# Patient Record
Sex: FEMALE | Race: BLACK OR AFRICAN AMERICAN
[De-identification: names, ages, dates, MRNs, and addresses within clinical notes are randomized per-mention and may not be internally consistent; named-entity substitution may affect disease eponyms.]

---

## 2017-02-13 ENCOUNTER — HOSPITAL ENCOUNTER (EMERGENCY)
Dept: HOSPITAL 72 - EMR | Age: 32
Discharge: HOME | End: 2017-02-13
Payer: MEDICAID

## 2017-02-13 VITALS — DIASTOLIC BLOOD PRESSURE: 69 MMHG | SYSTOLIC BLOOD PRESSURE: 116 MMHG

## 2017-02-13 VITALS — WEIGHT: 110 LBS | BODY MASS INDEX: 18.78 KG/M2 | HEIGHT: 64 IN

## 2017-02-13 VITALS — SYSTOLIC BLOOD PRESSURE: 118 MMHG | DIASTOLIC BLOOD PRESSURE: 79 MMHG

## 2017-02-13 DIAGNOSIS — N39.0: Primary | ICD-10-CM

## 2017-02-13 LAB
APPEARANCE UR: (no result)
BACTERIA #/AREA URNS HPF: (no result) /HPF
KETONES UR QL STRIP: NEGATIVE
LEUKOCYTE ESTERASE UR QL STRIP: (no result)
NITRITE UR QL STRIP: POSITIVE
PH UR STRIP: 6 [PH] (ref 4.5–8)
PROT UR QL STRIP: (no result)
SP GR UR STRIP: 1.02 (ref 1–1.03)
SQUAMOUS #/AREA URNS LPF: (no result) /LPF
UROBILINOGEN UR-MCNC: NORMAL MG/DL (ref 0–1)
WBC #/AREA URNS HPF: (no result) /HPF (ref 0–2)

## 2017-02-13 PROCEDURE — 81003 URINALYSIS AUTO W/O SCOPE: CPT

## 2017-02-13 PROCEDURE — 99284 EMERGENCY DEPT VISIT MOD MDM: CPT

## 2017-02-13 PROCEDURE — 87086 URINE CULTURE/COLONY COUNT: CPT

## 2017-02-13 PROCEDURE — 87181 SC STD AGAR DILUTION PER AGT: CPT

## 2017-02-13 PROCEDURE — 81025 URINE PREGNANCY TEST: CPT

## 2017-02-15 NOTE — EMERGENCY ROOM REPORT
History of Present Illness


General


Chief Complaint:  Female Urogenital Problems


Source:  Patient





Present Illness


HPI


The patient is a 32-year-old female who presented after increased dysuria as 

well as frequency of urination.  Patient had similar symptoms in the past when 

she had urinary tract infection.  Patient thinks that she has a urinary tract 

infection at this time.  The patient denies any vaginal discharge.  Patient has 

had some mild upper respiratory congestion.  She denied chest pain or shortness 

of breath.  The patient had no fever and had not been vomiting.


Allergies:  


Coded Allergies:  


     No Known Allergies (Unverified , 8/13/14)





Patient History


Past Medical History:  see triage record


Last Menstrual Period:  now-


Pregnant Now:  No


Reviewed Nursing Documentation:  PMH: Agreed, PSxH: Agreed





Nursing Documentation-PMH


Past Medical History:  No History, Except For


Hx Cardiac Problems:  No


Hx Cancer:  No


Hx Gastrointestinal Problems:  No


Hx Neurological Problems:  No





Review of Systems


All Other Systems:  negative except mentioned in HPI





Physical Exam





Vital Signs








  Date Time  Temp Pulse Resp B/P Pulse Ox O2 Delivery O2 Flow Rate FiO2


 


2/13/17 10:12 98.8  16 118/79 99 Room Air  


 


2/13/17 10:23  75      








General Appearance:  well appearing, no apparent distress, alert, GCS 15


Head:  normocephalic, atraumatic


ENT:  hearing grossly normal, normal voice


Neck:  full range of motion, supple


Respiratory:  no respiratory distress, speaking full sentences


Gastrointestinal:  normal inspection, normal bowel sounds, non tender


Genitourinary:  normal inspection, no CVA tenderness


Musculoskeletal:  normal inspection, back normal, no calf tenderness


Neurologic:  normal inspection, alert, oriented x3, responsive, normal gait


Psychiatric:  mood/affect normal


Skin:  no rash





Medical Decision Making


Diagnostic Impression:  


 Primary Impression:  


 UTI (urinary tract infection)


ER Course


Patient presented for dysuria.  Differential diagnosis included was not limited 

to appendicitis, urinary tract infection, pelvic inflammatory disease, 

urethritis, herpes among others. Patient's benign exam and does not appear to 

require any further imaging or laboratory testing at this time.  Urinalysis 

showed evidence of infection.  Patient was given prescription for Keflex.Urine 

pregnancy test was negative.   The patient is advised to follow up with  

primary care doctor in 1-2 days.  Patient is advised to return if any worsening 

condition or if any changes in status that are concerning.





Last Vital Signs








  Date Time  Temp Pulse Resp B/P Pulse Ox O2 Delivery O2 Flow Rate FiO2


 


2/13/17 12:04 98.8 69 16 116/69 99 Room Air  








Status:  improved


Disposition:  HOME, SELF-CARE


Condition:  Stable


Scripts


Phenazopyridine Hcl* (PYRIDIUM*) 200 Mg Tablet


200 MG ORAL THREE TIMES A DAY, #14 TAB 0 Refills


   Prov: Eduardo Sinclair         2/13/17 


Cephalexin* (KEFLEX*) 500 Mg Capsule


500 MG ORAL Q6H, #28 CAP 0 Refills


   Prov: Eduardo Sinclair         2/13/17


Referrals:  


NON PHYSICIAN (PCP)


Patient Instructions:  Urinary Tract Infection











Eduardo Sinclair Feb 15, 2017 13:43

## 2018-07-13 ENCOUNTER — HOSPITAL ENCOUNTER (EMERGENCY)
Dept: HOSPITAL 72 - EMR | Age: 33
Discharge: HOME | End: 2018-07-13
Payer: MEDICAID

## 2018-07-13 VITALS — WEIGHT: 110 LBS | BODY MASS INDEX: 18.78 KG/M2 | HEIGHT: 64 IN

## 2018-07-13 VITALS — SYSTOLIC BLOOD PRESSURE: 112 MMHG | DIASTOLIC BLOOD PRESSURE: 78 MMHG

## 2018-07-13 VITALS — DIASTOLIC BLOOD PRESSURE: 78 MMHG | SYSTOLIC BLOOD PRESSURE: 112 MMHG

## 2018-07-13 DIAGNOSIS — N39.0: Primary | ICD-10-CM

## 2018-07-13 DIAGNOSIS — F43.9: ICD-10-CM

## 2018-07-13 DIAGNOSIS — R53.1: ICD-10-CM

## 2018-07-13 LAB
APPEARANCE UR: CLEAR
APTT PPP: YELLOW S
GLUCOSE UR STRIP-MCNC: NEGATIVE MG/DL
KETONES UR QL STRIP: (no result)
LEUKOCYTE ESTERASE UR QL STRIP: (no result)
NITRITE UR QL STRIP: POSITIVE
PH UR STRIP: 5 [PH] (ref 4.5–8)
PROT UR QL STRIP: (no result)
SP GR UR STRIP: 1.02 (ref 1–1.03)
UROBILINOGEN UR-MCNC: NORMAL MG/DL (ref 0–1)

## 2018-07-13 PROCEDURE — 87181 SC STD AGAR DILUTION PER AGT: CPT

## 2018-07-13 PROCEDURE — 99283 EMERGENCY DEPT VISIT LOW MDM: CPT

## 2018-07-13 PROCEDURE — 87086 URINE CULTURE/COLONY COUNT: CPT

## 2018-07-13 PROCEDURE — 81003 URINALYSIS AUTO W/O SCOPE: CPT

## 2018-07-13 NOTE — EMERGENCY ROOM REPORT
History of Present Illness


General


Chief Complaint:  Abdominal Pain


Source:  Patient





Present Illness


HPI


Is a 33-year-old female with no significant past medical history.  She presents 

with initially abdominal pain but to me chief complaint of generalized malaise 

and weakness.  She says she a lot of stress and hasn't been eating much.  

Decreased appetite for less than a week.  Said her urine is dark.  Denies any 

nausea vomiting.  Denies any fever chills but not suicidal homicidal.  She 

checked in because she brought her daughter here for rash.


Allergies:  


Coded Allergies:  


     No Known Allergies (Unverified , 8/13/14)





Patient History


Past Medical History:  see triage record, old chart reviewed


Past Surgical History:  other


Pertinent Family History:  none


Social History:  Denies: smoking


Last Menstrual Period:  UNK


Pregnant Now:  No


Immunizations:  other


Reviewed Nursing Documentation:  PMH: Agreed; PSxH: Agreed





Nursing Documentation-PMH


Hx Cardiac Problems:  No


Hx Cancer:  No


Hx Gastrointestinal Problems:  No


Hx Neurological Problems:  No





Review of Systems


Constitutional:  Reports: malaise, weakness


Eye:  Denies: eye pain, blurred vision


ENT:  Denies: ear pain, nose congestion, throat swelling


Respiratory:  Denies: cough, shortness of breath


Cardiovascular:  Denies: chest pain, palpitations


Gastrointestinal:  Denies: abdominal pain, diarrhea, nausea, vomiting


Musculoskeletal:  Denies: back pain, joint pain


Skin:  Denies: rash


Neurological:  Denies: headache, numbness


Endocrine:  Denies: increased thirst, increased urine


Hematologic/Lymphatic:  Denies: easy bruising


All Other Systems:  negative except mentioned in HPI





Physical Exam





Vital Signs








  Date Time  Temp Pulse Resp B/P (MAP) Pulse Ox O2 Delivery O2 Flow Rate FiO2


 


7/13/18 20:16 97.0 61 18 112/78 100 Room Air  





 97.0       





vitals normal


Sp02 EP Interpretation:  reviewed, normal


General Appearance:  well appearing, no apparent distress, alert


Head:  normocephalic, atraumatic


Eyes:  bilateral eye PERRL, bilateral eye EOMI


ENT:  hearing grossly normal, normal pharynx


Neck:  full range of motion, supple, no meningismus


Respiratory:  chest non-tender, lungs clear, normal breath sounds


Cardiovascular #1:  regular rate, rhythm, no murmur


Gastrointestinal:  normal bowel sounds, non tender, no mass, no organomegaly, 

no bruit, non-distended


Musculoskeletal:  back normal, gait/station normal, normal range of motion


Psychiatric:  mood/affect normal


Skin:  warm/dry





Medical Decision Making


Diagnostic Impression:  


 Primary Impression:  


 UTI (urinary tract infection)


 Qualified Codes:  N30.00 - Acute cystitis without hematuria


 Additional Impression:  


 Stress and adjustment reaction


ER Course


Patient presents with stress reaction.  No culture for 5150.  She does have a 

urinary tract infection.  We'll put on antibiotics.





Last Vital Signs








  Date Time  Temp Pulse Resp B/P (MAP) Pulse Ox O2 Delivery O2 Flow Rate FiO2


 


7/13/18 20:25 97.0 61 18 112/78 100 Room Air  





 97.0       








Status:  unchanged


Disposition:  HOME, SELF-CARE


Condition:  Stable


Scripts


Cephalexin* (KEFLEX*) 500 Mg Capsule


500 MG ORAL TID, #21 CAP


   Prov: ABDOULAYE PRATHER M.D.         7/13/18





Additional Instructions:  


Follow-up with your doctor in 7 days.  Return if symptom worsen.











ABDOULAYE PRATHER M.D. Jul 13, 2018 21:42

## 2019-10-30 ENCOUNTER — HOSPITAL ENCOUNTER (EMERGENCY)
Dept: HOSPITAL 72 - EMR | Age: 34
Discharge: HOME | End: 2019-10-30
Payer: SELF-PAY

## 2019-10-30 VITALS — BODY MASS INDEX: 19.49 KG/M2 | HEIGHT: 63 IN | WEIGHT: 110 LBS

## 2019-10-30 VITALS — SYSTOLIC BLOOD PRESSURE: 113 MMHG | DIASTOLIC BLOOD PRESSURE: 75 MMHG

## 2019-10-30 VITALS — SYSTOLIC BLOOD PRESSURE: 122 MMHG | DIASTOLIC BLOOD PRESSURE: 74 MMHG

## 2019-10-30 DIAGNOSIS — L03.012: Primary | ICD-10-CM

## 2019-10-30 PROCEDURE — 99282 EMERGENCY DEPT VISIT SF MDM: CPT

## 2019-10-30 NOTE — EMERGENCY ROOM REPORT
History of Present Illness


General


Chief Complaint:  Pain


Source:  Patient





Present Illness


HPI


33 YO female presents to the ED c/o 8/10 in severity pain, swelling and 

erythema to the side of the cuticle of her left ring finger x 2 days. Pt. 

reports specifically biting a hang nail from there several days prior. Pt. 

denies trauma or fall. Denies fevers, chills, paresthesias,or finger pad 

tenderness. Has been trying warm water soaks without relief.


Allergies:  


Coded Allergies:  


     No Known Allergies (Unverified , 10/8/18)





Patient History


Past Medical History:  see triage record


Past Surgical History:  none


Pertinent Family History:  none


Last Menstrual Period:  9/01/19


Reviewed Nursing Documentation:  PMH: Agreed; PSxH: Agreed





Nursing Documentation-PMH


Past Medical History:  No Stated History


Hx Cardiac Problems:  No


Hx Cancer:  No


Hx Gastrointestinal Problems:  No


Hx Dialysis:  Yes - kidney stone


Hx Neurological Problems:  No





Review of Systems


All Other Systems:  negative except mentioned in HPI





Physical Exam





Vital Signs








  Date Time  Temp Pulse Resp B/P (MAP) Pulse Ox O2 Delivery O2 Flow Rate FiO2


 


10/30/19 14:37 98.2 71 16 113/75 (88) 100 Room Air  








Sp02 EP Interpretation:  reviewed, normal


General Appearance:  no apparent distress, alert, GCS 15, non-toxic


Head:  normocephalic, atraumatic


Eyes:  bilateral eye normal inspection, bilateral eye PERRL


ENT:  hearing grossly normal, normal voice


Neck:  full range of motion


Respiratory:  lungs clear, normal breath sounds, speaking full sentences


Cardiovascular #1:  regular rate, rhythm, normal capillary refill


Musculoskeletal:  back normal, gait/station normal, normal range of motion, non-

tender


Neurologic:  alert, oriented x3, responsive, motor strength/tone normal, 

sensory intact, speech normal, grossly normal


Psychiatric:  judgement/insight normal


Skin:  other - left ring finger paronychia- no visible purulence, no fluctuance

, no fingerpad tenderness. erythema, swelling and warmth only.





Medical Decision Making


PA Attestation


Dr. Burgos is my supervising Physician whom patient management has been 

discussed with.


Diagnostic Impression:  


 Primary Impression:  


 Paronychia


ER Course


33 YO female presents to the ED c/o 8/10 in severity pain, swelling and 

erythema to the side of the cuticle of her left ring finger x 2 days. Pt. 

reports specifically biting a hang nail from there several days prior. Pt. 

denies trauma or fall. Denies fevers, chills, paresthesias,or finger pad 

tenderness. Has been trying warm water soaks without relief. 





Ddx considered but are not limited to cellulitis, paronychia, eponychia, 

ingrown toe nail, fracture, d/L, gout








Vital signs: are WNL, pt. is afebrile


H&PE are most consistent with left ring finger paronychia- no visible purulence

, no fluctuance, no fingerpad tenderness. erythema, swelling and warmth only. 


ORDERS: none required at this time, the diagnosis is clinical





ED INTERVENTIONS: 


-None provided at this time. 








DISCHARGE: At this time pt. is stable for d/c to home. Will provide printed 

patient care instructions, and any necessary prescriptions. Care plan and 

follow up instructions have been discussed with the patient prior to discharge.





Last Vital Signs








  Date Time  Temp Pulse Resp B/P (MAP) Pulse Ox O2 Delivery O2 Flow Rate FiO2


 


10/30/19 14:42 98.2 71 16 113/75 100 Room Air  








Disposition:  HOME, SELF-CARE


Condition:  Stable


Scripts


Amoxicillin/Potassium Clav 875-125* (AUGMENTIN 875-125 TABLET*) 1 Each Tablet


1 TAB ORAL TWICE A DAY for 7 Days, #14 TAB


   Prov: Shayla Kimbrough         10/30/19 


Mupirocin* (MUPIROCIN*) 22 Gm Oint...g.


1 APPLIC TOPIC THREE TIMES A DAY, #22 GM


   Prov: Shayla Kimbrough         10/30/19


Patient Instructions:  Paronychia, Easy-to-Read





Additional Instructions:  


Take medications as directed. 





 ** Follow up with a Primary Care Provider in 3-5 days, even if your symptoms 

have resolved. ** 





Return sooner to ED if new symptoms occur, or current symptoms become worse. 











- Please note that this Emergency Department Report was dictated using [employer] technology software, occasionally this can lead to 

erroneous entry secondary to interpretation by the dictation equipment.











Shayla Kimbrough Oct 30, 2019 15:31

## 2019-10-30 NOTE — NUR
ED Nurse Note:



PT WALKED IN TO ER TODAY FROM HOME. AOX4. PT C/O LEFT HAND RING FINGER PAIN X 
YESTERDAY. PT STATES SHE BELIEVES IT MAY BE AN INGROWN NAIL. ON ASSESSMENT, 
SOME SWELLING AND REDNESS NOTED BUT NO DISCHARGE OR BLEEDING. FULL ROM OF 
DIGIT. PT DENIES NUMBNESS OR TINGLING.

## 2019-11-24 ENCOUNTER — HOSPITAL ENCOUNTER (EMERGENCY)
Dept: HOSPITAL 72 - EMR | Age: 34
Discharge: HOME | End: 2019-11-24
Payer: SELF-PAY

## 2019-11-24 VITALS — SYSTOLIC BLOOD PRESSURE: 128 MMHG | DIASTOLIC BLOOD PRESSURE: 92 MMHG

## 2019-11-24 VITALS — SYSTOLIC BLOOD PRESSURE: 125 MMHG | DIASTOLIC BLOOD PRESSURE: 78 MMHG

## 2019-11-24 VITALS — WEIGHT: 108 LBS | BODY MASS INDEX: 19.14 KG/M2 | HEIGHT: 63 IN

## 2019-11-24 VITALS — SYSTOLIC BLOOD PRESSURE: 130 MMHG | DIASTOLIC BLOOD PRESSURE: 80 MMHG

## 2019-11-24 VITALS — SYSTOLIC BLOOD PRESSURE: 135 MMHG | DIASTOLIC BLOOD PRESSURE: 98 MMHG

## 2019-11-24 DIAGNOSIS — Z3A.01: ICD-10-CM

## 2019-11-24 DIAGNOSIS — O23.41: ICD-10-CM

## 2019-11-24 DIAGNOSIS — Z87.442: ICD-10-CM

## 2019-11-24 DIAGNOSIS — O21.9: Primary | ICD-10-CM

## 2019-11-24 LAB
ADD MANUAL DIFF: NO
ALBUMIN SERPL-MCNC: 3.5 G/DL (ref 3.4–5)
ALBUMIN/GLOB SERPL: 1 {RATIO} (ref 1–2.7)
ALP SERPL-CCNC: 45 U/L (ref 46–116)
ALT SERPL-CCNC: 22 U/L (ref 12–78)
ANION GAP SERPL CALC-SCNC: 9 MMOL/L (ref 5–15)
APPEARANCE UR: CLEAR
APTT PPP: YELLOW S
AST SERPL-CCNC: 18 U/L (ref 15–37)
BASOPHILS NFR BLD AUTO: 0.4 % (ref 0–2)
BILIRUB SERPL-MCNC: 0.6 MG/DL (ref 0.2–1)
BUN SERPL-MCNC: 12 MG/DL (ref 7–18)
CALCIUM SERPL-MCNC: 8.3 MG/DL (ref 8.5–10.1)
CHLORIDE SERPL-SCNC: 103 MMOL/L (ref 98–107)
CO2 SERPL-SCNC: 22 MMOL/L (ref 21–32)
CREAT SERPL-MCNC: 0.8 MG/DL (ref 0.55–1.3)
EOSINOPHIL NFR BLD AUTO: 0.2 % (ref 0–3)
ERYTHROCYTE [DISTWIDTH] IN BLOOD BY AUTOMATED COUNT: 11.1 % (ref 11.6–14.8)
GLOBULIN SER-MCNC: 3.5 G/DL
GLUCOSE UR STRIP-MCNC: NEGATIVE MG/DL
HCT VFR BLD CALC: 34.3 % (ref 37–47)
HGB BLD-MCNC: 11.7 G/DL (ref 12–16)
KETONES UR QL STRIP: (no result)
LEUKOCYTE ESTERASE UR QL STRIP: (no result)
LYMPHOCYTES NFR BLD AUTO: 20.3 % (ref 20–45)
MCV RBC AUTO: 86 FL (ref 80–99)
MONOCYTES NFR BLD AUTO: 6.6 % (ref 1–10)
NEUTROPHILS NFR BLD AUTO: 72.5 % (ref 45–75)
NITRITE UR QL STRIP: POSITIVE
PH UR STRIP: 5 [PH] (ref 4.5–8)
PLATELET # BLD: 199 K/UL (ref 150–450)
POTASSIUM SERPL-SCNC: 3.8 MMOL/L (ref 3.5–5.1)
PROT UR QL STRIP: (no result)
RBC # BLD AUTO: 4 M/UL (ref 4.2–5.4)
SODIUM SERPL-SCNC: 134 MMOL/L (ref 136–145)
SP GR UR STRIP: 1.02 (ref 1–1.03)
UROBILINOGEN UR-MCNC: NORMAL MG/DL (ref 0–1)
WBC # BLD AUTO: 9.1 K/UL (ref 4.8–10.8)

## 2019-11-24 PROCEDURE — 84702 CHORIONIC GONADOTROPIN TEST: CPT

## 2019-11-24 PROCEDURE — 36415 COLL VENOUS BLD VENIPUNCTURE: CPT

## 2019-11-24 PROCEDURE — 96361 HYDRATE IV INFUSION ADD-ON: CPT

## 2019-11-24 PROCEDURE — 99284 EMERGENCY DEPT VISIT MOD MDM: CPT

## 2019-11-24 PROCEDURE — 96375 TX/PRO/DX INJ NEW DRUG ADDON: CPT

## 2019-11-24 PROCEDURE — 81003 URINALYSIS AUTO W/O SCOPE: CPT

## 2019-11-24 PROCEDURE — 86850 RBC ANTIBODY SCREEN: CPT

## 2019-11-24 PROCEDURE — 81025 URINE PREGNANCY TEST: CPT

## 2019-11-24 PROCEDURE — 76830 TRANSVAGINAL US NON-OB: CPT

## 2019-11-24 PROCEDURE — 76801 OB US < 14 WKS SINGLE FETUS: CPT

## 2019-11-24 PROCEDURE — 80053 COMPREHEN METABOLIC PANEL: CPT

## 2019-11-24 PROCEDURE — 86901 BLOOD TYPING SEROLOGIC RH(D): CPT

## 2019-11-24 PROCEDURE — 86900 BLOOD TYPING SEROLOGIC ABO: CPT

## 2019-11-24 PROCEDURE — 85025 COMPLETE CBC W/AUTO DIFF WBC: CPT

## 2019-11-24 PROCEDURE — 96374 THER/PROPH/DIAG INJ IV PUSH: CPT

## 2019-11-24 NOTE — EMERGENCY ROOM REPORT
History of Present Illness


General


Chief Complaint:  Vomiting


Source:  Patient





Present Illness


HPI


34-year-old female who is G3, P2 and no significant past medical history here 

complaining of 2 days of multiple bouts of nonbloody emesis and nausea.  

Patient denies abdominal pain, diarrhea and constipation, fever and chills.  

Denies drinking alcohol, or intake of new food.  Denies recent travel or 

antibiotic use.  Patient also has her daughter who presents with similar 

symptoms x2 days.  Patient denies any urinary frequency, dysuria, hematuria 

reports that she does not recall her last menstrual period and reports that she 

took an IUD out about 1 month ago had few episodes of spotting right after 

however has not had her menses since.  Denies chest pain, shortness of breath, 

palpitation, headache, dizziness.  No tenderness or guarding noted upon 

palpation of her abdomen.


Allergies:  


Coded Allergies:  


     No Known Allergies (Unverified , 10/8/18)





Patient History


Past Medical History:  see triage record


Past Surgical History:  unable to obtain


Pertinent Family History:  none


Pregnant Now:  No


Immunizations:  UTD


Reviewed Nursing Documentation:  PMH: Agreed; PSxH: Agreed





Nursing Documentation-PMH


Past Medical History:  No History, Except For


Hx Cardiac Problems:  No


Hx Cancer:  No


Hx Gastrointestinal Problems:  No


Hx Dialysis:  Yes - kidney stone


Hx Neurological Problems:  No





Review of Systems


All Other Systems:  negative except mentioned in HPI





Physical Exam





Vital Signs








  Date Time  Temp Pulse Resp B/P (MAP) Pulse Ox O2 Delivery O2 Flow Rate FiO2


 


19 11:51 98.4 60 20 117/75 (89) 100 Room Air  








Sp02 EP Interpretation:  reviewed, normal


General Appearance:  no apparent distress, alert, GCS 15, non-toxic


Head:  normocephalic, atraumatic


Eyes:  bilateral eye normal inspection, bilateral eye PERRL


ENT:  hearing grossly normal, normal pharynx, no angioedema, normal voice


Neck:  full range of motion, supple, supple/symm/no masses


Respiratory:  chest non-tender, lungs clear, normal breath sounds, no rhonchi, 

no respiratory distress, speaking full sentences


Cardiovascular #1:  regular rate, rhythm, no edema, no murmur, normal capillary 

refill


Cardiovascular #2:  2+ carotid (R), 2+ carotid (L), 2+ radial (R), 2+ radial (L)


Gastrointestinal:  normal bowel sounds, non tender, soft, no mass, non-distended

, no guarding, no hernia, no pulsatile mass, no rebound


Rectal:  deferred


Genitourinary:  normal inspection, no CVA tenderness


Musculoskeletal:  back normal, normal range of motion, no calf tenderness, 

pelvis stable, gait/station normal, non-tender


Neurologic:  alert, motor strength/tone normal, oriented x3, sensory intact, 

responsive, speech normal


Psychiatric:  judgement/insight normal, memory normal, mood/affect normal, no 

suicidal/homicidal ideation


Skin:  no rash


Lymphatic:  no adenopathy





Medical Decision Making


PA Attestation


All my diagnosis and treatment plans were reviewed ad discussed with my 

supervising physician Dr. Brock


Diagnostic Impression:  


 Primary Impression:  


 Positive pregnancy test


 Additional Impressions:  


 Vomiting during pregnancy


 UTI (urinary tract infection)


ER Course


34-year-old female who is G3, P2 and no significant past medical history here 

complaining of 2 days of multiple bouts of nonbloody emesis and nausea.  

Patient denies abdominal pain, diarrhea and constipation, fever and chills.  

Denies drinking alcohol, or intake of new food.  Denies recent travel or 

antibiotic use.  Patient also has her daughter who presents with similar 

symptoms x2 days.  Patient denies any urinary frequency, dysuria, hematuria 

reports that she does not recall her last menstrual period and reports that she 

took an IUD out about 1 month ago had few episodes of spotting right after 

however has not had her menses since.  Denies chest pain, shortness of breath, 

palpitation, headache, dizziness.  No tenderness or guarding noted upon 

palpation of her abdomen.





Ddx considered but are not limited to: UTI, pyelonephritis, threatened 

, ectopic pregnancy, molar pregnancy,











Vital signs: are WNL, pt. is afebrile








 H&PE are most consistent with: Uncomplicated UTI, vomiting during pregnancy, 

positive pregnancy test














ORDERS: UA, beta hCG, CBC, CMP, type and screen, OB ultrasound, prenatal 

vitamins, Macrobid, Reglan











ED INTERVENTIONS: NS bolus, Reglan























DISCHARGE: At this time pt. is stable for d/c to home. Will provide printed 

patient care instructions, and any necessary prescriptions. Care plan and 

follow up instructions have been discussed with the patient prior to discharge.

  Take medication as directed, follow-up with your OB/GYN as you are beta-hCG 

levels are elevated and may be falsely elevated however your 6 weeks pregnant 

and to need to have establish OB/GYN for further evaluation during her 

pregnancy.  If worsening symptoms return to emergency room.


CT/MRI/US Diagnostic Results


CT/MRI/US Diagnostic Results :  


   Imaging Test Ordered:  OB ultrasound


   Impression


US OB 1st TRIMESTER:





Single live IUP approximately 6 weeks 1 day.


Fetal heart rate 117 bpm.


Exam otherwise unremarkable.





Last Vital Signs








  Date Time  Temp Pulse Resp B/P (MAP) Pulse Ox O2 Delivery O2 Flow Rate FiO2


 


19 11:57 98.4 65 19 125/78 100 Room Air  








Disposition:  HOME, SELF-CARE


Condition:  Stable


Scripts


Nitrofurantoin Monohyd/M-Cryst* (MACROBID 100 MG*) 100 Mg Capsule


100 MG ORAL EVERY 12 HOURS for 7 Days, #14 CAP


   Prov: Stormy Rubi         19 


Prenatal No.137/Iron/Folic Acd (Prenatal Vitamin Tablet) 1 Each Tablet


1 EACH PO DAILY, #30 TAB


   Prov: Stormy Rubi         19 


Metoclopramide Hcl* (REGLAN*) 5 Mg Tablet


5 MG ORAL EVERY 6 HOURS, #20 TAB


   Prov: Stormy Rubi         19


Referrals:  


NON PHYSICIAN (PCP)


Patient Instructions:  Nausea and Vomiting, Adult, Pregnancy Test Information, 

Urinary Tract Infection, Easy-to-Read





Additional Instructions:  


Take medication as directed, follow-up with your primary care provider, 

establish an OB/GYN, started prenatal vitamins today if worsening symptoms 

return to the emergency room











Stormy Rubi 2019 15:46

## 2019-11-24 NOTE — DIAGNOSTIC IMAGING REPORT
Indication: Pelvic pain. Pregnant 34-year-old female

 

Technique: Grayscale and duplex Doppler imaging of the pelvis performed utilizing a

transabdominal scan and endovaginal scan.

 

Comparison: None

 

Findings:

 

There is a single living IUP 6 weeks one day gestational age based on crown-rump

length. Fetal heart tones demonstrated. Yolk sac noted.

 

There is mild free fluid within the cul-de-sac. Both ovaries are seen and show

dopplerable blood flow. The left ovary measures 4.1 x 3 x 2.5 cm. The right ovary

measures 3.7 x 2.4 x 2.0 cm.

 

IMPRESSION:

 

Single living IUP 6 weeks one day gestational age.